# Patient Record
(demographics unavailable — no encounter records)

---

## 2018-11-28 NOTE — MAM
EXAM DESCRIPTION: 

3D Screening BILATERAL : Digital Mammography.



CLINICAL HISTORY: 

67 years Female ANNUAL SCREENING . No complaints or personal

history of breast cancer.. Remote family history of breast

cancer. Childbirth. Hysterectomy 22 years ago. No HRT Lifetime

risk of developing breast cancer (Tyrer-Cuzick model)(%):  4.8.



COMPARISON: 

Baseline study at this facility.  No prior reports available.  



TECHNIQUE: 

Bilateral CC and MLO projection full-field images, digital

tomosynthesis mammographic technique.  Bilateral digital 2-D

full-field MLO images. CAD not available for tomosynthesis or 2-D

images.  



FINDINGS: 

The breast parenchymal density pattern is:  Scattered areas of

fibroglandular density.   No skin thickening or nipple

retraction.  Birads 2 Findings. Focal asymmetry at the 12:00 to

1:00 position of the right breast 3 cm from the nipple. This is

more dense than the surrounding fibroglandular tissues.

No new focal, stellate mass or density, focal asymmetry , and no

suspicious microcalcifications left breast.  



IMPRESSION: 

BI-RADS CATEGORY: 0 - INCOMPLETE- Need additional imaging

evaluation.

FOLLOW-UP: Recall for additional imaging: Targeted right breast

ultrasound in the region of interest. Possible follow-up

diagnostic digital tomosynthesis imaging if indicated by

ultrasound study.



Written communication concerning the IMPRESSION and Follow-up,

will be mailed to the patient and referring health care provider.



Electronically signed by:  Clayton Hsu MD  11/28/2018 4:05 PM

CST Workstation: [id number]